# Patient Record
Sex: MALE | Race: WHITE | NOT HISPANIC OR LATINO | Employment: FULL TIME | URBAN - METROPOLITAN AREA
[De-identification: names, ages, dates, MRNs, and addresses within clinical notes are randomized per-mention and may not be internally consistent; named-entity substitution may affect disease eponyms.]

---

## 2023-08-24 PROBLEM — F43.29 STRESS AND ADJUSTMENT REACTION: Status: ACTIVE | Noted: 2023-08-24

## 2023-08-24 PROBLEM — S39.012A BACK STRAIN: Status: ACTIVE | Noted: 2023-08-24
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
## 2023-08-24 PROBLEM — G47.00 INSOMNIA: Status: ACTIVE | Noted: 2023-08-24

## 2023-08-24 PROBLEM — R21 RASH AND OTHER NONSPECIFIC SKIN ERUPTION: Status: ACTIVE | Noted: 2020-11-02

## 2023-08-24 PROBLEM — L81.0 POSTINFLAMMATORY HYPERPIGMENTATION: Status: ACTIVE | Noted: 2020-11-02

## 2023-08-24 PROBLEM — Z94.84 STEM CELLS TRANSPLANT STATUS (MULTI): Status: ACTIVE | Noted: 2023-08-24

## 2023-08-24 PROBLEM — M54.9 BACKACHE: Status: ACTIVE | Noted: 2023-08-24

## 2023-08-24 PROBLEM — M25.519 SHOULDER PAIN: Status: ACTIVE | Noted: 2023-08-24

## 2023-08-24 RX ORDER — HYDROXYZINE HYDROCHLORIDE 25 MG/1
25 TABLET, FILM COATED ORAL NIGHTLY PRN
COMMUNITY
Start: 2022-01-24 | End: 2023-10-03 | Stop reason: ALTCHOICE

## 2023-08-24 RX ORDER — ACYCLOVIR 400 MG/1
1 TABLET ORAL 2 TIMES DAILY
COMMUNITY
Start: 2019-12-20 | End: 2023-10-03 | Stop reason: ALTCHOICE

## 2023-08-24 RX ORDER — DAPSONE 100 MG/1
1 TABLET ORAL DAILY
COMMUNITY
Start: 2019-12-20 | End: 2023-10-03 | Stop reason: ALTCHOICE

## 2023-08-24 RX ORDER — OMEPRAZOLE 20 MG/1
20 CAPSULE, DELAYED RELEASE ORAL
COMMUNITY
Start: 2019-12-20 | End: 2023-10-03 | Stop reason: ALTCHOICE

## 2023-08-24 RX ORDER — SERTRALINE HYDROCHLORIDE 50 MG/1
50 TABLET, FILM COATED ORAL DAILY
COMMUNITY
End: 2023-10-03 | Stop reason: ALTCHOICE

## 2023-10-03 ENCOUNTER — OFFICE VISIT (OUTPATIENT)
Dept: HEMATOLOGY/ONCOLOGY | Facility: HOSPITAL | Age: 33
End: 2023-10-03
Payer: MEDICARE

## 2023-10-03 ENCOUNTER — LAB (OUTPATIENT)
Dept: LAB | Facility: HOSPITAL | Age: 33
End: 2023-10-03
Payer: MEDICARE

## 2023-10-03 VITALS
TEMPERATURE: 98.2 F | WEIGHT: 137.1 LBS | BODY MASS INDEX: 20.31 KG/M2 | HEART RATE: 73 BPM | SYSTOLIC BLOOD PRESSURE: 122 MMHG | OXYGEN SATURATION: 100 % | RESPIRATION RATE: 16 BRPM | HEIGHT: 69 IN | DIASTOLIC BLOOD PRESSURE: 77 MMHG

## 2023-10-03 DIAGNOSIS — C85.90 NON-HODGKIN'S LYMPHOMA, UNSPECIFIED BODY REGION, UNSPECIFIED NON-HODGKIN LYMPHOMA TYPE (MULTI): ICD-10-CM

## 2023-10-03 DIAGNOSIS — C85.90 NON-HODGKIN'S LYMPHOMA, UNSPECIFIED BODY REGION, UNSPECIFIED NON-HODGKIN LYMPHOMA TYPE (MULTI): Primary | ICD-10-CM

## 2023-10-03 LAB
ALBUMIN SERPL BCP-MCNC: 4.7 G/DL (ref 3.4–5)
ALP SERPL-CCNC: 53 U/L (ref 33–120)
ALT SERPL W P-5'-P-CCNC: 12 U/L (ref 10–52)
ANION GAP SERPL CALC-SCNC: 12 MMOL/L (ref 10–20)
AST SERPL W P-5'-P-CCNC: 14 U/L (ref 9–39)
BASOPHILS # BLD AUTO: 0.05 X10*3/UL (ref 0–0.1)
BASOPHILS NFR BLD AUTO: 0.6 %
BILIRUB SERPL-MCNC: 0.6 MG/DL (ref 0–1.2)
BUN SERPL-MCNC: 15 MG/DL (ref 6–23)
CALCIUM SERPL-MCNC: 9.4 MG/DL (ref 8.6–10.3)
CHLORIDE SERPL-SCNC: 102 MMOL/L (ref 98–107)
CO2 SERPL-SCNC: 28 MMOL/L (ref 21–32)
CREAT SERPL-MCNC: 1.11 MG/DL (ref 0.5–1.3)
EOSINOPHIL # BLD AUTO: 0.08 X10*3/UL (ref 0–0.7)
EOSINOPHIL NFR BLD AUTO: 1 %
ERYTHROCYTE [DISTWIDTH] IN BLOOD BY AUTOMATED COUNT: 12.6 % (ref 11.5–14.5)
GFR SERPL CREATININE-BSD FRML MDRD: >90 ML/MIN/1.73M*2
GLUCOSE SERPL-MCNC: 99 MG/DL (ref 74–99)
HCT VFR BLD AUTO: 43.2 % (ref 41–52)
HGB BLD-MCNC: 15.3 G/DL (ref 13.5–17.5)
IMM GRANULOCYTES # BLD AUTO: 0.09 X10*3/UL (ref 0–0.7)
IMM GRANULOCYTES NFR BLD AUTO: 1.1 % (ref 0–0.9)
LDH SERPL L TO P-CCNC: 132 U/L (ref 84–246)
LYMPHOCYTES # BLD AUTO: 1.19 X10*3/UL (ref 1.2–4.8)
LYMPHOCYTES NFR BLD AUTO: 14.3 %
MCH RBC QN AUTO: 33.3 PG (ref 26–34)
MCHC RBC AUTO-ENTMCNC: 35.4 G/DL (ref 32–36)
MCV RBC AUTO: 94 FL (ref 80–100)
MONOCYTES # BLD AUTO: 0.61 X10*3/UL (ref 0.1–1)
MONOCYTES NFR BLD AUTO: 7.3 %
NEUTROPHILS # BLD AUTO: 6.28 X10*3/UL (ref 1.2–7.7)
NEUTROPHILS NFR BLD AUTO: 75.7 %
NRBC BLD-RTO: 0 /100 WBCS (ref 0–0)
PLATELET # BLD AUTO: 246 X10*3/UL (ref 150–450)
PMV BLD AUTO: 8.5 FL (ref 7.5–11.5)
POTASSIUM SERPL-SCNC: 4.4 MMOL/L (ref 3.5–5.3)
PROT SERPL-MCNC: 7.3 G/DL (ref 6.4–8.2)
RBC # BLD AUTO: 4.6 X10*6/UL (ref 4.5–5.9)
SODIUM SERPL-SCNC: 138 MMOL/L (ref 136–145)
URATE SERPL-MCNC: 5 MG/DL (ref 4–7.5)
WBC # BLD AUTO: 8.3 X10*3/UL (ref 4.4–11.3)

## 2023-10-03 PROCEDURE — 85025 COMPLETE CBC W/AUTO DIFF WBC: CPT

## 2023-10-03 PROCEDURE — 83615 LACTATE (LD) (LDH) ENZYME: CPT

## 2023-10-03 PROCEDURE — 99214 OFFICE O/P EST MOD 30 MIN: CPT | Performed by: NURSE PRACTITIONER

## 2023-10-03 PROCEDURE — 36415 COLL VENOUS BLD VENIPUNCTURE: CPT

## 2023-10-03 PROCEDURE — 84550 ASSAY OF BLOOD/URIC ACID: CPT

## 2023-10-03 PROCEDURE — 80053 COMPREHEN METABOLIC PANEL: CPT

## 2023-10-03 ASSESSMENT — PAIN SCALES - GENERAL: PAINLEVEL: 0-NO PAIN

## 2023-11-06 PROBLEM — C85.90 LYMPHOMA (MULTI): Status: ACTIVE | Noted: 2023-11-06

## 2023-11-07 NOTE — PROGRESS NOTES
"Patient ID:  Juan Strickland is a 33 y.o. male.  Referring Physician:   Oncologist Rosey Harrington MD  Primary Care Provider:  Colleen Cary MD    Assessment/Plan      10/6/23 Overall, Nicolás is doing well. No evidence of recurrence. Work is good. No further complaints of uncontrolled anxiety.  RTC 6 months.    Problem List Items Addressed This Visit             ICD-10-CM    Lymphoma (CMS/HCC) - Primary C85.90     cHL Diagonosis:  Noted lump in his neck at the beginning of March and evaluated by his PCP Dr. Harry who ordered xray followed by CT scan.      CT scan performed on 3/11/19 showed a lesion in the right paratracheal location measuring up to 5.0X7.3.  Also, component lesion extending to the prevascular location measures up to 4.8X7.1cm, lesion demonstrated no narrowing of the vasuclature of significant displacement, there was a subtle nodule in the right middle lobe measuring 3mm that was non specific.      He was referred for a core biopsy of the mediastinal lesion that demonstrated an atypical lymphohistiocytic infilrate \"suspicious for lymphoma\".  Morphology showed eosinophils, very rare large cells with prominent nucleoli and occasional mummification.  Immunostains show weak CD30 positivity in scattered cells, PAX-5 in larger cells, CD20 in small cells KI-67 30-40%.  Noted additional material was necessary to obtain a diagnosis.     He was referred to Dr. Faye who referred him to thoracic surgery for repeat biopsy.    Dr. Loi Lozada of cardiothoracic surgery  performed a supraclavicular biopsy of the lesion on 4/29/19, pathology consisten with cHL    Treatment:   Started ABVD on 5/31/19, interim PET/CT showed good response  Continued ABVD until 9/10/19 and scan after 4 cycles, 10/2/19, showed uptake in the b/l lymph nodes of the posteriors cervical node chains, supraclavicular, axillary and paratracheal lymph nodes as well as an ill defined soft tissue mass within the mediastinum with no SUV " "uptake.    Repeat biopsy was performed of the left supraclavicular node that was positive for recurrent cHL.  Started salvage with brentuximab/bendamustine, cycle #2 beginning 11/18/19.  PET/CT 12/5/19  Brentuximab alone on 12/10/19    Transplant:  Admission February 3-15, 2020 for autologous PBSCT (T=0, on 1/22/20), prepped with BEAM.     Hospital course complicated by: reaction to Carmustine (facial flushing, upper and lower extremity tingling requiring multiple meds), chemo induced nausea, restless leg syndrome and increased anxiety (thought to be related to Compazine), mild mucositis.    Referral to radiation oncology for consolidative radiation to the mediastinum. Completed 30gy radiation from 4/6/20-4/30/20.    Maintenance:  Brentuximab beginning 8/17/20, completed 12 cycles as of 5/6/21.   Follow up PET/CT 6/18/21 negative.               Relevant Orders    CBC and Auto Differential (Completed)    Comprehensive Metabolic Panel (Completed)    Uric acid (Completed)    Lactate dehydrogenase (Completed)    Clinic Appointment Request Follow Up; [facility]; [facility]     Subjective    History of Present Illness:  Nicolás presents to the clinic today, unaccompanied.     He states that \"work is phenomenal!\" Some fatigue/energy level varies. \"Good days and bad days.\" Just golfed 9 holes for a score of 41.    Momentary aches and pains come and go.     Appetite pretty good. Gained some weight.     \"Sick in June for about 1 week.\" Recent faint sore throat and mild cough, now resolved.    He states that \"anxiety is part of his personality\". Denies further need for Zoloft/stopped at some point.     Mild occasional night sweats.      Review of Systems   Constitutional:  Positive for fatigue.   HENT:  Negative.     Eyes: Negative.    Respiratory: Negative.     Cardiovascular: Negative.    Gastrointestinal: Negative.    Endocrine: Negative.    Genitourinary: Negative.     Musculoskeletal: Negative.    Skin: Negative.  "   Neurological: Negative.    Hematological: Negative.    Psychiatric/Behavioral: Negative.            Objective        Physical Exam  Vitals reviewed.   Constitutional:       Appearance: Normal appearance.   HENT:      Head: Normocephalic and atraumatic.      Nose: Nose normal.      Mouth/Throat:      Mouth: Mucous membranes are moist.   Eyes:      Pupils: Pupils are equal, round, and reactive to light.   Cardiovascular:      Rate and Rhythm: Normal rate and regular rhythm.      Pulses: Normal pulses.      Heart sounds: Normal heart sounds.   Pulmonary:      Effort: Pulmonary effort is normal.      Breath sounds: Normal breath sounds.   Abdominal:      General: Abdomen is flat. Bowel sounds are normal.      Palpations: Abdomen is soft.   Musculoskeletal:         General: Normal range of motion.      Cervical back: Normal range of motion.   Lymphadenopathy:      Comments: No palpable cervical, supraclavicular, axillary or inguinal lymphadenopathy.   Skin:     General: Skin is warm and dry.   Neurological:      General: No focal deficit present.      Mental Status: He is alert and oriented to person, place, and time.   Psychiatric:         Mood and Affect: Mood normal.

## 2023-11-07 NOTE — ASSESSMENT & PLAN NOTE
"cHL Diagonosis:  Noted lump in his neck at the beginning of March and evaluated by his PCP Dr. Harry who ordered xray followed by CT scan.      CT scan performed on 3/11/19 showed a lesion in the right paratracheal location measuring up to 5.0X7.3.  Also, component lesion extending to the prevascular location measures up to 4.8X7.1cm, lesion demonstrated no narrowing of the vasuclature of significant displacement, there was a subtle nodule in the right middle lobe measuring 3mm that was non specific.      He was referred for a core biopsy of the mediastinal lesion that demonstrated an atypical lymphohistiocytic infilrate \"suspicious for lymphoma\".  Morphology showed eosinophils, very rare large cells with prominent nucleoli and occasional mummification.  Immunostains show weak CD30 positivity in scattered cells, PAX-5 in larger cells, CD20 in small cells KI-67 30-40%.  Noted additional material was necessary to obtain a diagnosis.     He was referred to Dr. Faye who referred him to thoracic surgery for repeat biopsy.    Dr. Loi Lozada of cardiothoracic surgery  performed a supraclavicular biopsy of the lesion on 4/29/19, pathology consisten with cHL    Treatment:   Started ABVD on 5/31/19, interim PET/CT showed good response  Continued ABVD until 9/10/19 and scan after 4 cycles, 10/2/19, showed uptake in the b/l lymph nodes of the posteriors cervical node chains, supraclavicular, axillary and paratracheal lymph nodes as well as an ill defined soft tissue mass within the mediastinum with no SUV uptake.    Repeat biopsy was performed of the left supraclavicular node that was positive for recurrent cHL.  Started salvage with brentuximab/bendamustine, cycle #2 beginning 11/18/19.  PET/CT 12/5/19  Brentuximab alone on 12/10/19    Transplant:  Admission February 3-15, 2020 for autologous PBSCT (T=0, on 1/22/20), prepped with BEAM.     Hospital course complicated by: reaction to Carmustine (facial flushing, " upper and lower extremity tingling requiring multiple meds), chemo induced nausea, restless leg syndrome and increased anxiety (thought to be related to Compazine), mild mucositis.    Referral to radiation oncology for consolidative radiation to the mediastinum. Completed 30gy radiation from 4/6/20-4/30/20.    Maintenance:  Brentuximab beginning 8/17/20, completed 12 cycles as of 5/6/21.   Follow up PET/CT 6/18/21 negative.

## 2023-11-08 DIAGNOSIS — C81.90 HODGKIN LYMPHOMA, UNSPECIFIED HODGKIN LYMPHOMA TYPE, UNSPECIFIED BODY REGION (MULTI): Primary | ICD-10-CM

## 2023-11-08 ASSESSMENT — ENCOUNTER SYMPTOMS
RESPIRATORY NEGATIVE: 1
PSYCHIATRIC NEGATIVE: 1
FATIGUE: 1
NEUROLOGICAL NEGATIVE: 1
ENDOCRINE NEGATIVE: 1
GASTROINTESTINAL NEGATIVE: 1
CARDIOVASCULAR NEGATIVE: 1
MUSCULOSKELETAL NEGATIVE: 1
HEMATOLOGIC/LYMPHATIC NEGATIVE: 1
EYES NEGATIVE: 1

## 2023-11-13 ENCOUNTER — TELEPHONE (OUTPATIENT)
Dept: ADMISSION | Facility: HOSPITAL | Age: 33
End: 2023-11-13
Payer: MEDICARE

## 2023-11-13 DIAGNOSIS — K21.9 GASTROESOPHAGEAL REFLUX DISEASE, UNSPECIFIED WHETHER ESOPHAGITIS PRESENT: ICD-10-CM

## 2023-11-13 DIAGNOSIS — C85.90 NON-HODGKIN'S LYMPHOMA, UNSPECIFIED BODY REGION, UNSPECIFIED NON-HODGKIN LYMPHOMA TYPE (MULTI): Primary | ICD-10-CM

## 2023-11-13 RX ORDER — OMEPRAZOLE 20 MG/1
20 CAPSULE, DELAYED RELEASE ORAL
Qty: 30 CAPSULE | Refills: 11 | Status: SHIPPED | OUTPATIENT
Start: 2023-11-13 | End: 2024-04-05 | Stop reason: SDUPTHER

## 2024-04-05 ENCOUNTER — OFFICE VISIT (OUTPATIENT)
Dept: HEMATOLOGY/ONCOLOGY | Facility: HOSPITAL | Age: 34
End: 2024-04-05
Payer: MEDICARE

## 2024-04-05 ENCOUNTER — LAB (OUTPATIENT)
Dept: LAB | Facility: HOSPITAL | Age: 34
End: 2024-04-05
Payer: MEDICARE

## 2024-04-05 VITALS
BODY MASS INDEX: 21.99 KG/M2 | TEMPERATURE: 95.5 F | DIASTOLIC BLOOD PRESSURE: 77 MMHG | WEIGHT: 149.3 LBS | SYSTOLIC BLOOD PRESSURE: 117 MMHG | HEART RATE: 75 BPM | OXYGEN SATURATION: 100 % | RESPIRATION RATE: 16 BRPM

## 2024-04-05 DIAGNOSIS — E55.9 VITAMIN D DEFICIENCY, UNSPECIFIED: ICD-10-CM

## 2024-04-05 DIAGNOSIS — C81.78 OTHER CLASSICAL HODGKIN LYMPHOMA OF LYMPH NODES OF MULTIPLE REGIONS (MULTI): ICD-10-CM

## 2024-04-05 DIAGNOSIS — C81.90 HODGKIN LYMPHOMA, UNSPECIFIED HODGKIN LYMPHOMA TYPE, UNSPECIFIED BODY REGION (MULTI): ICD-10-CM

## 2024-04-05 DIAGNOSIS — R68.89 OTHER GENERAL SYMPTOMS AND SIGNS: ICD-10-CM

## 2024-04-05 DIAGNOSIS — C81.78 OTHER CLASSICAL HODGKIN LYMPHOMA OF LYMPH NODES OF MULTIPLE REGIONS (MULTI): Primary | ICD-10-CM

## 2024-04-05 DIAGNOSIS — K21.9 GASTROESOPHAGEAL REFLUX DISEASE, UNSPECIFIED WHETHER ESOPHAGITIS PRESENT: ICD-10-CM

## 2024-04-05 DIAGNOSIS — C85.90 NON-HODGKIN'S LYMPHOMA, UNSPECIFIED BODY REGION, UNSPECIFIED NON-HODGKIN LYMPHOMA TYPE (MULTI): ICD-10-CM

## 2024-04-05 LAB
25(OH)D3 SERPL-MCNC: 23 NG/ML (ref 30–100)
ALBUMIN SERPL BCP-MCNC: 4.3 G/DL (ref 3.4–5)
ALP SERPL-CCNC: 45 U/L (ref 33–120)
ALT SERPL W P-5'-P-CCNC: 12 U/L (ref 10–52)
ANION GAP SERPL CALC-SCNC: 11 MMOL/L (ref 10–20)
AST SERPL W P-5'-P-CCNC: 13 U/L (ref 9–39)
BASOPHILS # BLD AUTO: 0.02 X10*3/UL (ref 0–0.1)
BASOPHILS NFR BLD AUTO: 0.4 %
BILIRUB SERPL-MCNC: 0.8 MG/DL (ref 0–1.2)
BUN SERPL-MCNC: 13 MG/DL (ref 6–23)
CALCIUM SERPL-MCNC: 8.6 MG/DL (ref 8.6–10.3)
CHLORIDE SERPL-SCNC: 102 MMOL/L (ref 98–107)
CO2 SERPL-SCNC: 28 MMOL/L (ref 21–32)
CREAT SERPL-MCNC: 1.11 MG/DL (ref 0.5–1.3)
EGFRCR SERPLBLD CKD-EPI 2021: >90 ML/MIN/1.73M*2
EOSINOPHIL # BLD AUTO: 0.09 X10*3/UL (ref 0–0.7)
EOSINOPHIL NFR BLD AUTO: 1.6 %
ERYTHROCYTE [DISTWIDTH] IN BLOOD BY AUTOMATED COUNT: 13 % (ref 11.5–14.5)
GLUCOSE SERPL-MCNC: 99 MG/DL (ref 74–99)
HCT VFR BLD AUTO: 41.8 % (ref 41–52)
HGB BLD-MCNC: 14.6 G/DL (ref 13.5–17.5)
IGG SERPL-MCNC: 762 MG/DL (ref 700–1600)
IMM GRANULOCYTES # BLD AUTO: 0.04 X10*3/UL (ref 0–0.7)
IMM GRANULOCYTES NFR BLD AUTO: 0.7 % (ref 0–0.9)
LDH SERPL L TO P-CCNC: 119 U/L (ref 84–246)
LYMPHOCYTES # BLD AUTO: 1.02 X10*3/UL (ref 1.2–4.8)
LYMPHOCYTES NFR BLD AUTO: 18.5 %
MCH RBC QN AUTO: 32.4 PG (ref 26–34)
MCHC RBC AUTO-ENTMCNC: 34.9 G/DL (ref 32–36)
MCV RBC AUTO: 93 FL (ref 80–100)
MONOCYTES # BLD AUTO: 0.48 X10*3/UL (ref 0.1–1)
MONOCYTES NFR BLD AUTO: 8.7 %
NEUTROPHILS # BLD AUTO: 3.86 X10*3/UL (ref 1.2–7.7)
NEUTROPHILS NFR BLD AUTO: 70.1 %
NRBC BLD-RTO: 0 /100 WBCS (ref 0–0)
PLATELET # BLD AUTO: 227 X10*3/UL (ref 150–450)
POTASSIUM SERPL-SCNC: 3.8 MMOL/L (ref 3.5–5.3)
PROT SERPL-MCNC: 6.8 G/DL (ref 6.4–8.2)
RBC # BLD AUTO: 4.5 X10*6/UL (ref 4.5–5.9)
SODIUM SERPL-SCNC: 137 MMOL/L (ref 136–145)
T4 FREE SERPL-MCNC: 0.85 NG/DL (ref 0.78–1.48)
TESTOST SERPL-MCNC: 474 NG/DL (ref 240–1000)
TSH SERPL-ACNC: 6.63 MIU/L (ref 0.44–3.98)
URATE SERPL-MCNC: 5.3 MG/DL (ref 4–7.5)
WBC # BLD AUTO: 5.5 X10*3/UL (ref 4.4–11.3)

## 2024-04-05 PROCEDURE — 84403 ASSAY OF TOTAL TESTOSTERONE: CPT | Performed by: NURSE PRACTITIONER

## 2024-04-05 PROCEDURE — 80053 COMPREHEN METABOLIC PANEL: CPT

## 2024-04-05 PROCEDURE — 1036F TOBACCO NON-USER: CPT | Performed by: STUDENT IN AN ORGANIZED HEALTH CARE EDUCATION/TRAINING PROGRAM

## 2024-04-05 PROCEDURE — 99215 OFFICE O/P EST HI 40 MIN: CPT | Performed by: STUDENT IN AN ORGANIZED HEALTH CARE EDUCATION/TRAINING PROGRAM

## 2024-04-05 PROCEDURE — 84443 ASSAY THYROID STIM HORMONE: CPT | Performed by: NURSE PRACTITIONER

## 2024-04-05 PROCEDURE — 82306 VITAMIN D 25 HYDROXY: CPT | Performed by: NURSE PRACTITIONER

## 2024-04-05 PROCEDURE — 85025 COMPLETE CBC W/AUTO DIFF WBC: CPT

## 2024-04-05 PROCEDURE — 84439 ASSAY OF FREE THYROXINE: CPT | Performed by: NURSE PRACTITIONER

## 2024-04-05 PROCEDURE — 83615 LACTATE (LD) (LDH) ENZYME: CPT

## 2024-04-05 PROCEDURE — 36415 COLL VENOUS BLD VENIPUNCTURE: CPT

## 2024-04-05 PROCEDURE — 82784 ASSAY IGA/IGD/IGG/IGM EACH: CPT | Performed by: NURSE PRACTITIONER

## 2024-04-05 PROCEDURE — 84550 ASSAY OF BLOOD/URIC ACID: CPT

## 2024-04-05 RX ORDER — OMEPRAZOLE 20 MG/1
20 CAPSULE, DELAYED RELEASE ORAL
Qty: 90 CAPSULE | Refills: 3 | Status: SHIPPED | OUTPATIENT
Start: 2024-04-05 | End: 2025-04-05

## 2024-04-05 ASSESSMENT — ENCOUNTER SYMPTOMS
RESPIRATORY NEGATIVE: 1
HEMATOLOGIC/LYMPHATIC NEGATIVE: 1
FATIGUE: 1
CARDIOVASCULAR NEGATIVE: 1
ENDOCRINE NEGATIVE: 1
MUSCULOSKELETAL NEGATIVE: 1
PSYCHIATRIC NEGATIVE: 1
EYES NEGATIVE: 1
NEUROLOGICAL NEGATIVE: 1
GASTROINTESTINAL NEGATIVE: 1

## 2024-04-05 ASSESSMENT — PAIN SCALES - GENERAL: PAINLEVEL_OUTOF10: 0-NO PAIN

## 2024-04-05 NOTE — PROGRESS NOTES
"Patient ID:  Juan Strickland is a 33 y.o. male.  Referring Physician:   Oncologist Rosey Harrington MD  Primary Care Provider:  Colleen Cary MD    Assessment/Plan      4/5/2024 Overall, Nicolás is doing well. No evidence of recurrence. No further complaints of uncontrolled anxiety.  We refilled omeprazole and recommended that he take it daily.  Will recommend vitamin D supplementation.    RTC 6 months.    Problem List Items Addressed This Visit             ICD-10-CM    Lymphoma (CMS/HCC) - Primary C85.90    Relevant Orders    CBC and Auto Differential    Comprehensive Metabolic Panel    TSH with reflex to Free T4 if abnormal    Vitamin D 25-Hydroxy,Total (for eval of Vitamin D levels)    Testosterone     Other Visit Diagnoses         Codes    Other general symptoms and signs     R68.89    Relevant Orders    TSH with reflex to Free T4 if abnormal    Vitamin D deficiency, unspecified     E55.9    Relevant Orders    Vitamin D 25-Hydroxy,Total (for eval of Vitamin D levels)          Subjective    History of Present Illness:  Nicolás presents to the clinic today, unaccompanied. No weight loss, night sweats, fever, chills, or enlarged Lns. Chronic AMIN, no swelling.     Went to [place] this winter and got sick for 1 wk.     Appetite pretty good.    He states that \"anxiety is part of his personality\". Denies further need for Zoloft/stopped at some point.  He is working remotely as a  and enjoying his job.    His only medication is omeprazole, which he takes as needed, but he finds that this does not always control his symptoms.      Review of Systems   Constitutional:  Positive for fatigue.   HENT:  Negative.     Eyes: Negative.    Respiratory: Negative.     Cardiovascular: Negative.    Gastrointestinal: Negative.    Endocrine: Negative.    Genitourinary: Negative.     Musculoskeletal: Negative.    Skin: Negative.    Neurological: Negative.    Hematological: Negative.    Psychiatric/Behavioral: Negative.          "   Objective        Physical Exam  Vitals reviewed.   Constitutional:       Appearance: Normal appearance.   HENT:      Head: Normocephalic and atraumatic.      Nose: Nose normal.      Mouth/Throat:      Mouth: Mucous membranes are moist.   Eyes:      Pupils: Pupils are equal, round, and reactive to light.   Cardiovascular:      Rate and Rhythm: Normal rate and regular rhythm.      Pulses: Normal pulses.      Heart sounds: Normal heart sounds.   Pulmonary:      Effort: Pulmonary effort is normal.      Breath sounds: Normal breath sounds.   Abdominal:      General: Abdomen is flat. Bowel sounds are normal.      Palpations: Abdomen is soft.   Musculoskeletal:         General: Normal range of motion.      Cervical back: Normal range of motion.   Lymphadenopathy:      Comments: No palpable cervical, supraclavicular, axillary or inguinal lymphadenopathy.   Skin:     General: Skin is warm and dry.   Neurological:      General: No focal deficit present.      Mental Status: He is alert and oriented to person, place, and time.   Psychiatric:         Mood and Affect: Mood normal.       Labs reviewed; TSH elevated but FT4 normal.  Vit D 23.

## 2024-04-08 ENCOUNTER — TELEPHONE (OUTPATIENT)
Dept: HEMATOLOGY/ONCOLOGY | Facility: HOSPITAL | Age: 34
End: 2024-04-08
Payer: MEDICARE

## 2024-04-08 NOTE — TELEPHONE ENCOUNTER
RN called and left a message for the patient to call the office back. MD Harrington said that his Vitamin D level was low and she recommends he can start OTC Vitamin D 2140-2577 IU daily (gummies OK).

## 2024-04-09 NOTE — TELEPHONE ENCOUNTER
Patient returning call to team. This RN relayed Dr. Harrington's vitamin D instructions.   Patient was able to verbalize understanding. No further questions or concerns at this time.

## 2024-08-01 DIAGNOSIS — K21.9 GASTROESOPHAGEAL REFLUX DISEASE, UNSPECIFIED WHETHER ESOPHAGITIS PRESENT: ICD-10-CM

## 2024-08-01 RX ORDER — OMEPRAZOLE 20 MG/1
20 CAPSULE, DELAYED RELEASE ORAL
Qty: 90 CAPSULE | Refills: 3 | Status: SHIPPED | OUTPATIENT
Start: 2024-08-01 | End: 2025-08-01

## 2024-08-27 DIAGNOSIS — K21.9 GASTROESOPHAGEAL REFLUX DISEASE, UNSPECIFIED WHETHER ESOPHAGITIS PRESENT: ICD-10-CM

## 2024-08-27 RX ORDER — OMEPRAZOLE 20 MG/1
20 CAPSULE, DELAYED RELEASE ORAL
Qty: 90 CAPSULE | Refills: 3 | Status: SHIPPED | OUTPATIENT
Start: 2024-08-27 | End: 2025-08-27

## 2024-10-04 ENCOUNTER — APPOINTMENT (OUTPATIENT)
Dept: HEMATOLOGY/ONCOLOGY | Facility: HOSPITAL | Age: 34
End: 2024-10-04
Payer: MEDICARE

## 2024-10-08 ENCOUNTER — OFFICE VISIT (OUTPATIENT)
Dept: HEMATOLOGY/ONCOLOGY | Facility: HOSPITAL | Age: 34
End: 2024-10-08
Payer: MEDICARE

## 2024-10-08 ENCOUNTER — LAB (OUTPATIENT)
Dept: LAB | Facility: HOSPITAL | Age: 34
End: 2024-10-08
Payer: MEDICARE

## 2024-10-08 VITALS
DIASTOLIC BLOOD PRESSURE: 79 MMHG | WEIGHT: 157.2 LBS | BODY MASS INDEX: 23.15 KG/M2 | TEMPERATURE: 98.1 F | SYSTOLIC BLOOD PRESSURE: 119 MMHG | HEART RATE: 86 BPM | RESPIRATION RATE: 12 BRPM | OXYGEN SATURATION: 100 %

## 2024-10-08 DIAGNOSIS — C85.90 NON-HODGKIN'S LYMPHOMA, UNSPECIFIED BODY REGION, UNSPECIFIED NON-HODGKIN LYMPHOMA TYPE: Primary | ICD-10-CM

## 2024-10-08 DIAGNOSIS — C81.78 OTHER CLASSICAL HODGKIN LYMPHOMA OF LYMPH NODES OF MULTIPLE REGIONS: ICD-10-CM

## 2024-10-08 DIAGNOSIS — E55.9 VITAMIN D DEFICIENCY, UNSPECIFIED: ICD-10-CM

## 2024-10-08 DIAGNOSIS — C85.90 NON-HODGKIN'S LYMPHOMA, UNSPECIFIED BODY REGION, UNSPECIFIED NON-HODGKIN LYMPHOMA TYPE: ICD-10-CM

## 2024-10-08 LAB
25(OH)D3 SERPL-MCNC: 33 NG/ML (ref 30–100)
ALBUMIN SERPL BCP-MCNC: 4.8 G/DL (ref 3.4–5)
ALP SERPL-CCNC: 54 U/L (ref 33–120)
ALT SERPL W P-5'-P-CCNC: 16 U/L (ref 10–52)
ANION GAP SERPL CALC-SCNC: 12 MMOL/L (ref 10–20)
AST SERPL W P-5'-P-CCNC: 18 U/L (ref 9–39)
BASOPHILS # BLD AUTO: 0.05 X10*3/UL (ref 0–0.1)
BASOPHILS NFR BLD AUTO: 0.7 %
BILIRUB SERPL-MCNC: 0.7 MG/DL (ref 0–1.2)
BUN SERPL-MCNC: 14 MG/DL (ref 6–23)
CALCIUM SERPL-MCNC: 9.9 MG/DL (ref 8.6–10.3)
CHLORIDE SERPL-SCNC: 103 MMOL/L (ref 98–107)
CO2 SERPL-SCNC: 29 MMOL/L (ref 21–32)
CREAT SERPL-MCNC: 1.08 MG/DL (ref 0.5–1.3)
EGFRCR SERPLBLD CKD-EPI 2021: >90 ML/MIN/1.73M*2
EOSINOPHIL # BLD AUTO: 0.07 X10*3/UL (ref 0–0.7)
EOSINOPHIL NFR BLD AUTO: 1 %
ERYTHROCYTE [DISTWIDTH] IN BLOOD BY AUTOMATED COUNT: 12.6 % (ref 11.5–14.5)
GLUCOSE SERPL-MCNC: 101 MG/DL (ref 74–99)
HCT VFR BLD AUTO: 48.1 % (ref 41–52)
HGB BLD-MCNC: 16.5 G/DL (ref 13.5–17.5)
IMM GRANULOCYTES # BLD AUTO: 0.06 X10*3/UL (ref 0–0.7)
IMM GRANULOCYTES NFR BLD AUTO: 0.9 % (ref 0–0.9)
LDH SERPL L TO P-CCNC: 156 U/L (ref 84–246)
LYMPHOCYTES # BLD AUTO: 1.16 X10*3/UL (ref 1.2–4.8)
LYMPHOCYTES NFR BLD AUTO: 17.3 %
MCH RBC QN AUTO: 33.1 PG (ref 26–34)
MCHC RBC AUTO-ENTMCNC: 34.3 G/DL (ref 32–36)
MCV RBC AUTO: 96 FL (ref 80–100)
MONOCYTES # BLD AUTO: 0.6 X10*3/UL (ref 0.1–1)
MONOCYTES NFR BLD AUTO: 8.9 %
NEUTROPHILS # BLD AUTO: 4.78 X10*3/UL (ref 1.2–7.7)
NEUTROPHILS NFR BLD AUTO: 71.2 %
NRBC BLD-RTO: 0 /100 WBCS (ref 0–0)
PLATELET # BLD AUTO: 266 X10*3/UL (ref 150–450)
POTASSIUM SERPL-SCNC: 4.1 MMOL/L (ref 3.5–5.3)
PROT SERPL-MCNC: 7.8 G/DL (ref 6.4–8.2)
RBC # BLD AUTO: 4.99 X10*6/UL (ref 4.5–5.9)
SODIUM SERPL-SCNC: 140 MMOL/L (ref 136–145)
WBC # BLD AUTO: 6.7 X10*3/UL (ref 4.4–11.3)

## 2024-10-08 PROCEDURE — 99214 OFFICE O/P EST MOD 30 MIN: CPT | Performed by: NURSE PRACTITIONER

## 2024-10-08 PROCEDURE — 83615 LACTATE (LD) (LDH) ENZYME: CPT

## 2024-10-08 PROCEDURE — 85025 COMPLETE CBC W/AUTO DIFF WBC: CPT

## 2024-10-08 PROCEDURE — 82306 VITAMIN D 25 HYDROXY: CPT

## 2024-10-08 PROCEDURE — 80053 COMPREHEN METABOLIC PANEL: CPT

## 2024-10-08 PROCEDURE — 36415 COLL VENOUS BLD VENIPUNCTURE: CPT

## 2024-10-08 ASSESSMENT — ENCOUNTER SYMPTOMS
CARDIOVASCULAR NEGATIVE: 1
EYES NEGATIVE: 1
HEMATOLOGIC/LYMPHATIC NEGATIVE: 1
GASTROINTESTINAL NEGATIVE: 1
NERVOUS/ANXIOUS: 1
MUSCULOSKELETAL NEGATIVE: 1
ENDOCRINE NEGATIVE: 1
FATIGUE: 1
NEUROLOGICAL NEGATIVE: 1
RESPIRATORY NEGATIVE: 1
SLEEP DISTURBANCE: 1

## 2024-10-08 ASSESSMENT — PAIN SCALES - GENERAL: PAINLEVEL: 0-NO PAIN

## 2024-10-08 NOTE — PROGRESS NOTES
"Patient ID:  Juan Strickland is a 34 y.o. male.  Referring Physician:   Rosey Harrington MD PhD  [address] [place] [address]  Primary Care Provider:  Colleen Cary MD    Assessment/Plan      4/5/2024 Overall, Nicolás is doing well. No evidence of recurrence. No further complaints of uncontrolled anxiety.    EOT 5/6/21; Cont every 6m until 5/26 then yearly       Oncology History   Lymphoma   11/6/2023 Initial Diagnosis    Lymphoma    Noted lump in his neck at the beginning of March and evaluated by his PCP Dr. Harry who ordered xray followed by CT scan.       CT scan performed on 3/11/19 showed a lesion in the right paratracheal location measuring up to 5.0X7.3.  Also, component lesion extending to the prevascular location measures up to 4.8X7.1cm, lesion demonstrated no narrowing of the vasuclature of significant displacement, there was a subtle nodule in the right middle lobe measuring 3mm that was non specific.       He was referred for a core biopsy of the mediastinal lesion that demonstrated an atypical lymphohistiocytic infilrate \"suspicious for lymphoma\".  Morphology showed eosinophils, very rare large cells with prominent nucleoli and occasional mummification.  Immunostains show weak CD30 positivity in scattered cells, PAX-5 in larger cells, CD20 in small cells KI-67 30-40%.  Noted additional material was necessary to obtain a diagnosis.      He was referred to Dr. Faye who referred him to thoracic surgery for repeat biopsy.     Dr. Loi Lozada of cardiothoracic surgery  performed a supraclavicular biopsy of the lesion on 4/29/19, pathology consisten with cHL     Treatment:   Started ABVD on 5/31/19, interim PET/CT showed good response  Continued ABVD until 9/10/19 and scan after 4 cycles, 10/2/19, showed uptake in the b/l lymph nodes of the posteriors cervical node chains, supraclavicular, axillary and paratracheal lymph nodes as well as an ill defined soft tissue mass within the " "mediastinum with no SUV uptake.     Repeat biopsy was performed of the left supraclavicular node that was positive for recurrent cHL.  Started salvage with brentuximab/bendamustine, cycle #2 beginning 11/18/19.  PET/CT 12/5/19  Brentuximab alone on 12/10/19     Transplant:  Admission February 3-15, 2020 for autologous PBSCT (T=0, on 1/22/20), prepped with BEAM.     Hospital course complicated by: reaction to Carmustine (facial flushing, upper and lower extremity tingling requiring multiple meds), chemo induced nausea, restless leg syndrome and increased anxiety (thought to be related to Compazine), mild mucositis.     Referral to radiation oncology for consolidative radiation to the mediastinum. Completed 30gy radiation from 4/6/20-4/30/20.     Maintenance:  Brentuximab beginning 8/17/20, completed 12 cycles as of 5/6/21.   Follow up PET/CT 6/18/21 negative.                 Subjective    History of Present Illness:  Nicolás presents to the clinic today, unaccompanied.     Denies weight loss, night sweats, fever, chills, or LA.      Appetite good. Gained weight. Some taste changes. Slightly coated tongue.    Fatigue. \"Job is good. Some job stress.\"     He states that \"anxiety is part of his personality\". Denies further need for Zoloft/stopped at some point.       His only medication is omeprazole, which he takes as needed, but he finds that this does not always control his symptoms.    Due for flu covid.     3 family/friends recently dx with cancer. Feels able to be a support.           Review of Systems   Constitutional:  Positive for fatigue.   HENT:  Negative.     Eyes: Negative.    Respiratory: Negative.     Cardiovascular: Negative.    Gastrointestinal: Negative.    Endocrine: Negative.    Genitourinary: Negative.     Musculoskeletal: Negative.    Skin: Negative.    Neurological: Negative.    Hematological: Negative.    Psychiatric/Behavioral:  Positive for sleep disturbance. The patient is nervous/anxious.      "        Objective        Physical Exam  Vitals reviewed.   Constitutional:       Appearance: Normal appearance.   HENT:      Head: Normocephalic and atraumatic.      Nose: Nose normal.      Mouth/Throat:      Mouth: Mucous membranes are moist.   Eyes:      Pupils: Pupils are equal, round, and reactive to light.   Cardiovascular:      Rate and Rhythm: Normal rate and regular rhythm.      Pulses: Normal pulses.      Heart sounds: Normal heart sounds.   Pulmonary:      Effort: Pulmonary effort is normal.      Breath sounds: Normal breath sounds.   Abdominal:      General: Abdomen is flat. Bowel sounds are normal.      Palpations: Abdomen is soft.   Musculoskeletal:         General: Normal range of motion.      Cervical back: Normal range of motion.   Skin:     General: Skin is warm and dry.   Neurological:      General: No focal deficit present.      Mental Status: He is alert and oriented to person, place, and time.   Psychiatric:         Mood and Affect: Mood normal.

## 2025-04-07 ASSESSMENT — ENCOUNTER SYMPTOMS
ENDOCRINE NEGATIVE: 1
FATIGUE: 1
RESPIRATORY NEGATIVE: 1
HEMATOLOGIC/LYMPHATIC NEGATIVE: 1
GASTROINTESTINAL NEGATIVE: 1
NERVOUS/ANXIOUS: 1
CARDIOVASCULAR NEGATIVE: 1
EYES NEGATIVE: 1
SLEEP DISTURBANCE: 1
MUSCULOSKELETAL NEGATIVE: 1
NEUROLOGICAL NEGATIVE: 1

## 2025-04-07 NOTE — PROGRESS NOTES
"Patient ID:  Juan Strickland is a 34 y.o. male.  Referring Physician:   Dr Harrington  Primary Care Provider:  Colleen Cary MD    Assessment/Plan    4/8/25 Overall, Nicolás is doing well. No evidence of recurrence. No further complaints of uncontrolled anxiety.  Cont every 6m follow up until 5/26 then yearly. Cancer to 5k flyer given by Anita Kline CNP. Recommend to re-establish with PCP; due for ECHO and onc-card follow up..    Oncology History   Lymphoma   11/6/2023 Initial Diagnosis    Lymphoma    Noted lump in his neck at the beginning of March and evaluated by his PCP Dr. Harry who ordered xray followed by CT scan.       CT scan performed on 3/11/19 showed a lesion in the right paratracheal location measuring up to 5.0X7.3.  Also, component lesion extending to the prevascular location measures up to 4.8X7.1cm, lesion demonstrated no narrowing of the vasuclature of significant displacement, there was a subtle nodule in the right middle lobe measuring 3mm that was non specific.       He was referred for a core biopsy of the mediastinal lesion that demonstrated an atypical lymphohistiocytic infilrate \"suspicious for lymphoma\".  Morphology showed eosinophils, very rare large cells with prominent nucleoli and occasional mummification.  Immunostains show weak CD30 positivity in scattered cells, PAX-5 in larger cells, CD20 in small cells KI-67 30-40%.  Noted additional material was necessary to obtain a diagnosis.      He was referred to Dr. Faye who referred him to thoracic surgery for repeat biopsy.     Dr. Loi Lozada of cardiothoracic surgery  performed a supraclavicular biopsy of the lesion on 4/29/19, pathology consisten with cHL     Treatment:   Started ABVD on 5/31/19, interim PET/CT showed good response  Continued ABVD until 9/10/19 and scan after 4 cycles, 10/2/19, showed uptake in the b/l lymph nodes of the posteriors cervical node chains, supraclavicular, axillary and paratracheal lymph nodes as well " "as an ill defined soft tissue mass within the mediastinum with no SUV uptake.     Repeat biopsy was performed of the left supraclavicular node that was positive for recurrent cHL.  Started salvage with brentuximab/bendamustine, cycle #2 beginning 11/18/19.  PET/CT 12/5/19  Brentuximab alone on 12/10/19     Transplant:  Admission February 3-15, 2020 for autologous PBSCT (T=0, on 1/22/20), prepped with BEAM.     Hospital course complicated by: reaction to Carmustine (facial flushing, upper and lower extremity tingling requiring multiple meds), chemo induced nausea, restless leg syndrome and increased anxiety (thought to be related to Compazine), mild mucositis.     Referral to radiation oncology for consolidative radiation to the mediastinum. Completed 30gy radiation from 4/6/20-4/30/20.     Maintenance:  Brentuximab beginning 8/17/20, completed 12 cycles as of 5/6/21.   Follow up PET/CT 6/18/21 negative.             Health maintenance  4/8/25 Referral to PCP.  3/25/20 ECHO EF 55-60% and onc-card follow up. 4/8/25 Due for follow-up.  4/5/24 TSH 6.63, thyroxine 0.88; repeat level pending.  4/5/24 Testosterone 474.  10/8/24 Vit D 33.     Immunizations  Competed post SCT vaccine protocol.  1/25/21 Titers  Polio +  Mumps +  HiB +  Strep pneumo +/-  Rubeola +  Diphtheria +  Bordetella +  Tetanus +  Rubella +    Recommend Covid booster; annual seasonal influenza vaccine.    Subjective    History of Present Illness:  Nicolás presents to the clinic today, unaccompanied.     Denies weight loss, night sweats, fever, chills, or LA.      Appetite good. Gained weight.     Fatigue. \"Job is good. Some job stress.\"     He states that \"anxiety is part of his personality\". Denies further need for Zoloft/stopped at some point.       His only medication is omeprazole, which he takes as needed, but he finds that this does not always control his symptoms.    2 family/friends recently dx with cancer. Feels able to be a support. Aunt passed " away.     Excited for golf season. Thinking about running a marathon.    Some aches and pains come and go.         Review of Systems   Constitutional:  Positive for fatigue.   HENT:  Negative.     Eyes: Negative.    Respiratory: Negative.     Cardiovascular: Negative.    Gastrointestinal: Negative.    Endocrine: Negative.    Genitourinary: Negative.     Musculoskeletal: Negative.    Skin: Negative.    Neurological: Negative.    Hematological: Negative.    Psychiatric/Behavioral:  Positive for sleep disturbance. The patient is nervous/anxious.             Objective        Physical Exam  Vitals reviewed.   Constitutional:       Appearance: Normal appearance.   HENT:      Head: Normocephalic and atraumatic.      Nose: Nose normal.      Mouth/Throat:      Mouth: Mucous membranes are moist.   Eyes:      Pupils: Pupils are equal, round, and reactive to light.   Cardiovascular:      Rate and Rhythm: Normal rate and regular rhythm.      Pulses: Normal pulses.      Heart sounds: Normal heart sounds.   Pulmonary:      Effort: Pulmonary effort is normal.      Breath sounds: Normal breath sounds.   Abdominal:      General: Abdomen is flat. Bowel sounds are normal.      Palpations: Abdomen is soft.   Musculoskeletal:         General: Normal range of motion.      Cervical back: Normal range of motion.   Lymphadenopathy:      Comments: No palpable cervical, supraclavicular or axillary LA.    Skin:     General: Skin is warm and dry.   Neurological:      General: No focal deficit present.      Mental Status: He is alert and oriented to person, place, and time.   Psychiatric:         Mood and Affect: Mood normal.

## 2025-04-08 ENCOUNTER — OFFICE VISIT (OUTPATIENT)
Dept: HEMATOLOGY/ONCOLOGY | Facility: HOSPITAL | Age: 35
End: 2025-04-08
Payer: MEDICARE

## 2025-04-08 ENCOUNTER — LAB (OUTPATIENT)
Dept: LAB | Facility: HOSPITAL | Age: 35
End: 2025-04-08
Payer: MEDICARE

## 2025-04-08 VITALS
HEART RATE: 93 BPM | WEIGHT: 161 LBS | SYSTOLIC BLOOD PRESSURE: 117 MMHG | BODY MASS INDEX: 23.71 KG/M2 | TEMPERATURE: 96.8 F | RESPIRATION RATE: 16 BRPM | OXYGEN SATURATION: 95 % | DIASTOLIC BLOOD PRESSURE: 75 MMHG

## 2025-04-08 DIAGNOSIS — C85.90 NON-HODGKIN'S LYMPHOMA, UNSPECIFIED BODY REGION, UNSPECIFIED NON-HODGKIN LYMPHOMA TYPE: ICD-10-CM

## 2025-04-08 DIAGNOSIS — Z13.6 ENCOUNTER FOR SCREENING FOR CARDIOVASCULAR DISORDERS: ICD-10-CM

## 2025-04-08 DIAGNOSIS — D89.89 OTHER SPECIFIED DISORDERS INVOLVING THE IMMUNE MECHANISM, NOT ELSEWHERE CLASSIFIED: ICD-10-CM

## 2025-04-08 LAB
ALBUMIN SERPL BCP-MCNC: 4.9 G/DL (ref 3.4–5)
ALP SERPL-CCNC: 56 U/L (ref 33–120)
ALT SERPL W P-5'-P-CCNC: 17 U/L (ref 10–52)
ANION GAP SERPL CALC-SCNC: 11 MMOL/L (ref 10–20)
AST SERPL W P-5'-P-CCNC: 17 U/L (ref 9–39)
BASOPHILS # BLD AUTO: 0.03 X10*3/UL (ref 0–0.1)
BASOPHILS NFR BLD AUTO: 0.5 %
BILIRUB SERPL-MCNC: 0.6 MG/DL (ref 0–1.2)
BUN SERPL-MCNC: 14 MG/DL (ref 6–23)
CALCIUM SERPL-MCNC: 9.9 MG/DL (ref 8.6–10.3)
CHLORIDE SERPL-SCNC: 101 MMOL/L (ref 98–107)
CO2 SERPL-SCNC: 30 MMOL/L (ref 21–32)
CREAT SERPL-MCNC: 1.22 MG/DL (ref 0.5–1.3)
EGFRCR SERPLBLD CKD-EPI 2021: 81 ML/MIN/1.73M*2
EOSINOPHIL # BLD AUTO: 0.07 X10*3/UL (ref 0–0.7)
EOSINOPHIL NFR BLD AUTO: 1.1 %
ERYTHROCYTE [DISTWIDTH] IN BLOOD BY AUTOMATED COUNT: 12.7 % (ref 11.5–14.5)
GLUCOSE SERPL-MCNC: 105 MG/DL (ref 74–99)
HCT VFR BLD AUTO: 47.1 % (ref 41–52)
HGB BLD-MCNC: 16.3 G/DL (ref 13.5–17.5)
IMM GRANULOCYTES # BLD AUTO: 0.08 X10*3/UL (ref 0–0.7)
IMM GRANULOCYTES NFR BLD AUTO: 1.2 % (ref 0–0.9)
LDH SERPL L TO P-CCNC: 155 U/L (ref 84–246)
LYMPHOCYTES # BLD AUTO: 0.94 X10*3/UL (ref 1.2–4.8)
LYMPHOCYTES NFR BLD AUTO: 14.2 %
MCH RBC QN AUTO: 32.6 PG (ref 26–34)
MCHC RBC AUTO-ENTMCNC: 34.6 G/DL (ref 32–36)
MCV RBC AUTO: 94 FL (ref 80–100)
MONOCYTES # BLD AUTO: 0.51 X10*3/UL (ref 0.1–1)
MONOCYTES NFR BLD AUTO: 7.7 %
NEUTROPHILS # BLD AUTO: 4.97 X10*3/UL (ref 1.2–7.7)
NEUTROPHILS NFR BLD AUTO: 75.3 %
NRBC BLD-RTO: 0 /100 WBCS (ref 0–0)
PLATELET # BLD AUTO: 266 X10*3/UL (ref 150–450)
POTASSIUM SERPL-SCNC: 4.3 MMOL/L (ref 3.5–5.3)
PROT SERPL-MCNC: 7.5 G/DL (ref 6.4–8.2)
RBC # BLD AUTO: 5 X10*6/UL (ref 4.5–5.9)
SODIUM SERPL-SCNC: 138 MMOL/L (ref 136–145)
WBC # BLD AUTO: 6.6 X10*3/UL (ref 4.4–11.3)

## 2025-04-08 PROCEDURE — 36415 COLL VENOUS BLD VENIPUNCTURE: CPT

## 2025-04-08 PROCEDURE — 99214 OFFICE O/P EST MOD 30 MIN: CPT | Performed by: NURSE PRACTITIONER

## 2025-04-08 PROCEDURE — 84075 ASSAY ALKALINE PHOSPHATASE: CPT

## 2025-04-08 PROCEDURE — 85025 COMPLETE CBC W/AUTO DIFF WBC: CPT

## 2025-04-08 PROCEDURE — 83615 LACTATE (LD) (LDH) ENZYME: CPT

## 2025-04-08 ASSESSMENT — PAIN SCALES - GENERAL: PAINLEVEL_OUTOF10: 0-NO PAIN

## 2025-04-08 NOTE — LETTER
April 8, 2025    Juan Alfarophillip  [address] [place] [address]    To Whom It May Concern     Juan Strickland has a history of cancer. He has to be cautious in the sun, even while sitting in a car. To provide protection, car windows are tinted.     If you have any questions or concerns, please don't hesitate to call.    Sincerely,             Lena Medina, APRN-CNP

## 2025-04-11 ENCOUNTER — TELEPHONE (OUTPATIENT)
Dept: HEMATOLOGY/ONCOLOGY | Facility: HOSPITAL | Age: 35
End: 2025-04-11
Payer: COMMERCIAL

## 2025-04-11 NOTE — TELEPHONE ENCOUNTER
Called and left message with Nicolás to let him know due for 5 year ECHO and onc-card evaluation.  Appt requested. Instructed to call back if questions.

## 2025-10-10 ENCOUNTER — APPOINTMENT (OUTPATIENT)
Dept: HEMATOLOGY/ONCOLOGY | Facility: HOSPITAL | Age: 35
End: 2025-10-10
Payer: COMMERCIAL